# Patient Record
Sex: FEMALE | Race: ASIAN | NOT HISPANIC OR LATINO | Employment: FULL TIME | URBAN - METROPOLITAN AREA
[De-identification: names, ages, dates, MRNs, and addresses within clinical notes are randomized per-mention and may not be internally consistent; named-entity substitution may affect disease eponyms.]

---

## 2023-04-04 ENCOUNTER — MEDICAL CORRESPONDENCE (OUTPATIENT)
Dept: HEALTH INFORMATION MANAGEMENT | Facility: CLINIC | Age: 38
End: 2023-04-04

## 2023-04-04 ENCOUNTER — LAB REQUISITION (OUTPATIENT)
Dept: LAB | Facility: CLINIC | Age: 38
End: 2023-04-04
Payer: COMMERCIAL

## 2023-04-04 DIAGNOSIS — Z01.419 ENCOUNTER FOR GYNECOLOGICAL EXAMINATION (GENERAL) (ROUTINE) WITHOUT ABNORMAL FINDINGS: ICD-10-CM

## 2023-04-04 LAB — PHQ9 SCORE: 7

## 2023-04-04 PROCEDURE — 87624 HPV HI-RISK TYP POOLED RSLT: CPT | Mod: ORL | Performed by: OBSTETRICS & GYNECOLOGY

## 2023-04-04 PROCEDURE — G0145 SCR C/V CYTO,THINLAYER,RESCR: HCPCS | Mod: ORL | Performed by: OBSTETRICS & GYNECOLOGY

## 2023-04-05 ENCOUNTER — TRANSFERRED RECORDS (OUTPATIENT)
Dept: HEALTH INFORMATION MANAGEMENT | Facility: CLINIC | Age: 38
End: 2023-04-05

## 2023-04-05 ENCOUNTER — LAB REQUISITION (OUTPATIENT)
Dept: LAB | Facility: CLINIC | Age: 38
End: 2023-04-05
Payer: COMMERCIAL

## 2023-04-05 DIAGNOSIS — Z13.1 ENCOUNTER FOR SCREENING FOR DIABETES MELLITUS: ICD-10-CM

## 2023-04-05 DIAGNOSIS — Z11.3 ENCOUNTER FOR SCREENING FOR INFECTIONS WITH A PREDOMINANTLY SEXUAL MODE OF TRANSMISSION: ICD-10-CM

## 2023-04-05 DIAGNOSIS — Z13.228 ENCOUNTER FOR SCREENING FOR OTHER METABOLIC DISORDERS: ICD-10-CM

## 2023-04-05 DIAGNOSIS — Z13.220 ENCOUNTER FOR SCREENING FOR LIPOID DISORDERS: ICD-10-CM

## 2023-04-05 LAB
CHOLEST SERPL-MCNC: 208 MG/DL
ERYTHROCYTE [DISTWIDTH] IN BLOOD BY AUTOMATED COUNT: 12.4 % (ref 10–15)
HBA1C MFR BLD: 7 %
HCT VFR BLD AUTO: 40.8 % (ref 35–47)
HDLC SERPL-MCNC: 37 MG/DL
HGB BLD-MCNC: 13.8 G/DL (ref 11.7–15.7)
LDLC SERPL CALC-MCNC: 119 MG/DL
MCH RBC QN AUTO: 31.4 PG (ref 26.5–33)
MCHC RBC AUTO-ENTMCNC: 33.8 G/DL (ref 31.5–36.5)
MCV RBC AUTO: 93 FL (ref 78–100)
NONHDLC SERPL-MCNC: 171 MG/DL
PLATELET # BLD AUTO: 269 10E3/UL (ref 150–450)
RBC # BLD AUTO: 4.4 10E6/UL (ref 3.8–5.2)
TRIGL SERPL-MCNC: 261 MG/DL
WBC # BLD AUTO: 7.5 10E3/UL (ref 4–11)

## 2023-04-05 PROCEDURE — 86780 TREPONEMA PALLIDUM: CPT | Mod: ORL | Performed by: OBSTETRICS & GYNECOLOGY

## 2023-04-05 PROCEDURE — 86696 HERPES SIMPLEX TYPE 2 TEST: CPT | Mod: ORL | Performed by: OBSTETRICS & GYNECOLOGY

## 2023-04-05 PROCEDURE — 83036 HEMOGLOBIN GLYCOSYLATED A1C: CPT | Mod: ORL | Performed by: OBSTETRICS & GYNECOLOGY

## 2023-04-05 PROCEDURE — 87340 HEPATITIS B SURFACE AG IA: CPT | Mod: ORL | Performed by: OBSTETRICS & GYNECOLOGY

## 2023-04-05 PROCEDURE — 87389 HIV-1 AG W/HIV-1&-2 AB AG IA: CPT | Mod: ORL | Performed by: OBSTETRICS & GYNECOLOGY

## 2023-04-05 PROCEDURE — 80061 LIPID PANEL: CPT | Mod: ORL | Performed by: OBSTETRICS & GYNECOLOGY

## 2023-04-05 PROCEDURE — 85027 COMPLETE CBC AUTOMATED: CPT | Mod: ORL | Performed by: OBSTETRICS & GYNECOLOGY

## 2023-04-06 LAB
BKR LAB AP GYN ADEQUACY: NORMAL
BKR LAB AP GYN INTERPRETATION: NORMAL
BKR LAB AP HPV REFLEX: NORMAL
BKR LAB AP LMP: NORMAL
BKR LAB AP PREVIOUS ABNL DX: NORMAL
BKR LAB AP PREVIOUS ABNORMAL: NORMAL
HBV SURFACE AG SERPL QL IA: NONREACTIVE
HIV 1+2 AB+HIV1 P24 AG SERPL QL IA: NONREACTIVE
HSV1 IGG SERPL QL IA: <0.01 INDEX
HSV1 IGG SERPL QL IA: NORMAL
HSV2 IGG SERPL QL IA: 0.06 INDEX
HSV2 IGG SERPL QL IA: NORMAL
PATH REPORT.COMMENTS IMP SPEC: NORMAL
PATH REPORT.COMMENTS IMP SPEC: NORMAL
PATH REPORT.RELEVANT HX SPEC: NORMAL
T PALLIDUM AB SER QL: NONREACTIVE

## 2023-04-10 LAB
HUMAN PAPILLOMA VIRUS 16 DNA: NEGATIVE
HUMAN PAPILLOMA VIRUS 18 DNA: NEGATIVE
HUMAN PAPILLOMA VIRUS FINAL DIAGNOSIS: NORMAL
HUMAN PAPILLOMA VIRUS OTHER HR: NEGATIVE

## 2023-04-11 ENCOUNTER — MEDICAL CORRESPONDENCE (OUTPATIENT)
Dept: HEALTH INFORMATION MANAGEMENT | Facility: CLINIC | Age: 38
End: 2023-04-11
Payer: COMMERCIAL

## 2023-04-13 ENCOUNTER — TRANSCRIBE ORDERS (OUTPATIENT)
Dept: OTHER | Age: 38
End: 2023-04-13

## 2023-04-13 DIAGNOSIS — R73.09 OTHER ABNORMAL GLUCOSE: Primary | ICD-10-CM

## 2023-06-25 ENCOUNTER — HEALTH MAINTENANCE LETTER (OUTPATIENT)
Age: 38
End: 2023-06-25

## 2023-08-04 NOTE — CONFIDENTIAL NOTE
RECORDS RECEIVED FROM: internal / External     DATE RECEIVED: 10.12.23   NOTES (FOR ALL VISITS) STATUS DETAILS   OFFICE NOTES from referring provider External   Dr Khari Pierre @ [facility]    OFFICE NOTES from other specialist  4.11.23, 4.5.23 [facility]      MEDICATION LIST internal       LABS     DIABETES: HBGA1C, CREATININE, FASTING LIPIDS, MICROALBUMIN URINE, POTASSIUM, TSH, T4    THYROID: TSH, T4, CBC, THYRODLONULIN, TOTAL T3, FREE T4, CALCITONIN, CEA internal /External   Cbc-4.5.23  UDUE9Z-8.5.23  Received labs- 4.5.23

## 2023-10-05 NOTE — PROGRESS NOTES
Outcome for 10/05/23 11:47 AM: Dr. Scribblest message sent  Kathryn Portillo MA  Outcome for 10/10/23 9:09 AM: Patient is not checking blood sugars  Rebecca Mobley LPN

## 2023-10-10 ENCOUNTER — TELEPHONE (OUTPATIENT)
Dept: ENDOCRINOLOGY | Facility: CLINIC | Age: 38
End: 2023-10-10
Payer: COMMERCIAL

## 2023-10-10 NOTE — TELEPHONE ENCOUNTER
Spoke with patient in regards to obtaining blood glucose data for appointment scheduled on 10/12/2023. Patient is currently not checking blood sugars. Patient reports had surgery to remove a thyroid nodule about 3 weeks ago. Plans to have follow up blood work on week 5 of post-op. Would like to still keep appointment for consult in regards to diabetes.     Rebecca Mobley LPN 10/10/23 9:06 AM

## 2023-10-11 NOTE — PROGRESS NOTES
Endocrinology and Diabetes Clinic Visit     Reason for Consult: High hemoglobin A1c   Consulting Provider: Patricia Cardoso   PCP: No primary care provider on file.    Subjective:   Jaycee Higgins is a 37 year old female seen today for a new consultation for a recent high hemoglobin A1C with past medical history significant for ADHD, GERD, and recent thyroid cancer diagnosis. This visit was completed virtually.     On interview today, patient shares that over the COVID pandemic she did not go to the doctor and so more recently with changing insurance companies started establishing herself with a few physicians after needing to get her IUD replaced. At her IUD visit she had lab work done, and this revealed a HA1C of 7.0 and her doctor referred her to endocrinology and recommended establishing with a PCP. Since then, she established with a PCP in [place] who started her on metformin 500 mg extended release and losartan 25 mg. Her PCP also found a thyroid nodule on exam, which lead to an FNA biopsy that was inconclusive and followed by a right hemithyroidectomy with one parathyroid gland also removed (it was found to be enlarged in surgery) and follow up pathology showed cancer. Her thyroid surgery was around 3 weeks ago at [facility] in [facility]. We do not currently have access to the surgical pathology or to additional records of this diagnosis.     From a diabetes consult perspective, patient shared that she has been taking the metformin with no issues. She endorses fatigue, but attributes this to her recent surgery. She notes that she drinks a lot of water partially to stay hydrated and partially because she is thirsty. She does note that she does urinate frequently, but attributes this to water intake. She tries to play tennis 2-3 times a week and typically eats around two meals a day with when she has to take her medications. She does not smoke or drink alcohol. She does note  that she has poor vision, but has an eye doctor she sees each year. She denies numbness or tingling in her legs and any heart problems. Her weight has decreased since her thyroid surgery and more recently she has been craving sugar. She denies any history of anemia and endorses some iron deficiency, but this resolved with eating more beans before donating blood. She denies any history of diabetic coma. She notes that her twin sister has type 2 diabetes that was diagnosed a few years ago.         10/5/2023     8:21 PM   including   1. Since your last visit to our clinic (or if this your first visit, since you last saw your primary care provider), have you experienced any of the following symptoms that may be related to low blood sugars? Sweating that wasn't due to exertion   2. Since your last visit to our clinic (or if this your first visit, since you last saw your primary care provider), have you experienced any of the following symptoms that may be related to prolonged high blood sugars? No, I have not experienced any of these symptoms   3. Have you had any concerns that you would like to discuss today? Other   4. Do you have any female family members who have had heart attacks or strokes before age 60 or male relatives who have had heart attacks or strokes before age 50? No   5. Do you have any family members who have had complications from diabetes such as kidney disease, heart disease or strokes, retinopathy (a vision problem), or amputations? No   6. Who do you live with?  No one   Little interest or pleasure in doing things? Not at all   Feeling down, depressed, or hopeless? Not at all   10.  Are you considering a pregnancy or interested in discussing pregnancy prevention today? No        Medications:   Current Outpatient Medications   Medication Sig Dispense Refill    albuterol (PROAIR HFA/PROVENTIL HFA/VENTOLIN HFA) 108 (90 Base) MCG/ACT inhaler Inhale 2 puffs into the lungs every 4 hours as needed       amphetamine-dextroamphetamine (ADDERALL XR) 20 MG 24 hr capsule       levonorgestrel (KYLEENA) 19.5 MG IUD 1 each by Intrauterine route      losartan (COZAAR) 25 MG tablet       metFORMIN (GLUCOPHAGE XR) 500 MG 24 hr tablet       naproxen (NAPROSYN) 500 MG tablet        In addition:   Escitalopram 1 tab per day for anxiety  Nexplanon (placed 4/2023, IUD removed)    Past Medical History:  No past medical history on file.  ADHD, GERD, recent diagnosis of thyroid cancer     Past surgical history:  No past surgical history on file.  Recent right hemithyroidectomy with parathyroid gland removal at [facility] in [facility], completed late September    Patient Active Problem List   Diagnosis    Diabetes mellitus, type 2 (H)       Family History:  No family history on file.  Patient is adopted. Patient has twin sister who was diagnosed with type 2 diabetes a few years ago.     Social History:  Social History     Tobacco Use    Smoking status: Former     Types: Cigarettes    Smokeless tobacco: Former    Tobacco comments:     Smoked socially a long time ago    Substance Use Topics    Alcohol use: Not on file     Patient does not currently smoke or consume alcohol. Patient works as a .     Review of Systems:   A comprehensive ROS was negative except as noted in HPI.     Physical Examination:  Wt Readings from Last 4 Encounters:   No data found for Wt     There is no height or weight on file to calculate BMI.    BP Readings from Last 3 Encounters:   No data found for BP     GENERAL: Healthy, alert and no distress  EYES: Eyes grossly normal to inspection.  No discharge or erythema, or obvious scleral/conjunctival abnormalities.  NECK: No visible goiter.   RESP: No audible wheeze, cough, or visible cyanosis.  No increased work of breathing.    SKIN: Visible skin clear. No significant rash, abnormal pigmentation or lesions.  NEURO: Cranial nerves grossly intact.  Mentation and speech  appropriate.  PSYCH: Affect normal/bright, judgement and insight intact, normal speech and appearance well-groomed.    Labs and Studies:   Lab Results   Component Value Date    A1C 7.0 04/05/2023       Latest Reference Range & Units 04/05/23 09:41   Cholesterol <200 mg/dL 208 (H)   HDL Cholesterol >=50 mg/dL 37 (L)   Hemoglobin A1C <5.7 % 7.0 (H)   LDL Cholesterol Calculated <=100 mg/dL 119 (H)   Non HDL Cholesterol <130 mg/dL 171 (H)   Triglycerides <150 mg/dL 261 (H)   WBC 4.0 - 11.0 10e3/uL 7.5   Hemoglobin 11.7 - 15.7 g/dL 13.8   Hematocrit 35.0 - 47.0 % 40.8   Platelet Count 150 - 450 10e3/uL 269   RBC Count 3.80 - 5.20 10e6/uL 4.40   MCV 78 - 100 fL 93   MCH 26.5 - 33.0 pg 31.4   MCHC 31.5 - 36.5 g/dL 33.8   RDW 10.0 - 15.0 % 12.4   Treponema Antibodies Nonreactive  Nonreactive   Hep B Surface Agn Nonreactive  Nonreactive   HERPES SIMPLEX VIRUS TYPE 1 AND 2 IGG  Rpt   HSV Type 1 IgG Instrument Value <0.90 Index <0.01   Herpes Simplex Virus Type 1 IgG No HSV-1 IgG antibodies detected  No HSV-1 IgG antibodies detected.   HSV Type 2 IgG Instrument Value <0.90 Index 0.06   Herpes Simplex Virus Type 2 IgG No HSV-2 IgG antibodies detected  No HSV-2 IgG antibodies detected.   HIV Antigen Antibody Combo Nonreactive  Nonreactive   (H): Data is abnormally high  (L): Data is abnormally low  Rpt: View report in Results Review for more information    MRI abdomen 2018: No adrenal nodule       Assessment: Jaycee Higgins is a 37 year old female seen today for a new consultation for a recent high hemoglobin A1C with past medical history significant for ADHD, GERD, and recent thyroid cancer diagnosis.     1. High hemoglobin A1C-  Discussed with the patient that with only one hemoglobin A1C result at 7.0, another follow-up lab needs to be completed to confirm a diagnosis of diabetes. Additionally, the type of the diabetes (Type 1 vs Type 2) also needs to be determined with follow-up labs of c-peptide and WILLIAM antibody to  rule out an autoimmune cause. She should continue metformin, and we made a plan for close follow up in our clinic.     2. Thyroid cancer - Patient had a recent diagnosis of thyroid cancer that is being follow-up by [facility] in [facility]. We do not have records for review. She has follow up with an outside endocrinologist already scheduled and was encouraged to keep this appointment. In the future, we can consolidate care so that she is following with only one endocrine clinic for both diabetes and thyroid cancer management.     3. Obesity.     4. Hepatic steatosis on previous imaging.     5. Dyslipidemia.     6. Hypertension.     Plan:   - Have labs done: stimulated c-peptide, glucose, CMP, WILLIAM antibody, hemoglobin A1C, TSH with T4 reflex  - Continue metformin    Follow up in 2 months.       Video-Visit Details    Type of Service:  Video Visit    Video Start Time:  10:55 AM  (Dr. Garcia start 11:36)    Video End Time:  11:52 AM  (Dr. Garcia end 11:52)    Platform Used for Video Visit: GutCheck     Provider Location: Off-site      Sara Riley MS3  Medical Student       Physician Attestation   I, Jennifer Garcia MD, was present with the medical student who participated in the service and in the documentation of the note.  I have verified the history and personally performed the physical exam and medical decision making.  I agree with the assessment and plan of care as documented in the note.      New diagnosis of diabetes. This is most likely type 2 diabetes, but given her young age we will also include testing for autoimmune/type 1 diabetes. BRANT is also a possibility and testing for this could be considered in the future. She has no findings today to suggest Cushing syndrome or another underlying cause for diabetes.     Follow up is needed for her reported recent diagnosis of thyroid carcinoma. She has this already planned at an outside clinic and was encouraged to follow through. In the future,  for diabetes our plan will be to refer to CDE and provide teaching for finger stick blood glucose monitoring; this was deferred for now given that she already has a lot to manage with the thyroid diagnosis.     62 minutes spent on the date of the encounter doing chart review, history and exam, documentation and further activities per the note.     Jennifer Garcia MD  Endocrinology and Diabetes   [phone]

## 2023-10-12 ENCOUNTER — VIRTUAL VISIT (OUTPATIENT)
Dept: ENDOCRINOLOGY | Facility: CLINIC | Age: 38
End: 2023-10-12
Payer: COMMERCIAL

## 2023-10-12 ENCOUNTER — PRE VISIT (OUTPATIENT)
Dept: ENDOCRINOLOGY | Facility: CLINIC | Age: 38
End: 2023-10-12

## 2023-10-12 DIAGNOSIS — E66.01 MORBID OBESITY (H): ICD-10-CM

## 2023-10-12 DIAGNOSIS — R73.09 OTHER ABNORMAL GLUCOSE: ICD-10-CM

## 2023-10-12 DIAGNOSIS — C73 MALIGNANT NEOPLASM OF THYROID GLAND (H): ICD-10-CM

## 2023-10-12 DIAGNOSIS — E11.9 TYPE 2 DIABETES MELLITUS WITHOUT COMPLICATION, WITHOUT LONG-TERM CURRENT USE OF INSULIN (H): Primary | ICD-10-CM

## 2023-10-12 PROCEDURE — 99205 OFFICE O/P NEW HI 60 MIN: CPT | Mod: VID | Performed by: INTERNAL MEDICINE

## 2023-10-12 RX ORDER — ALBUTEROL SULFATE 90 UG/1
2 AEROSOL, METERED RESPIRATORY (INHALATION) EVERY 4 HOURS PRN
COMMUNITY
Start: 2022-07-01 | End: 2024-05-22

## 2023-10-12 RX ORDER — NAPROXEN 500 MG/1
TABLET ORAL
COMMUNITY
Start: 2023-03-08 | End: 2024-04-30

## 2023-10-12 RX ORDER — METFORMIN HCL 500 MG
TABLET, EXTENDED RELEASE 24 HR ORAL
COMMUNITY
Start: 2023-06-06

## 2023-10-12 RX ORDER — LOSARTAN POTASSIUM 25 MG/1
TABLET ORAL
COMMUNITY
Start: 2023-06-06

## 2023-10-12 RX ORDER — DEXTROAMPHETAMINE SACCHARATE, AMPHETAMINE ASPARTATE MONOHYDRATE, DEXTROAMPHETAMINE SULFATE AND AMPHETAMINE SULFATE 5; 5; 5; 5 MG/1; MG/1; MG/1; MG/1
CAPSULE, EXTENDED RELEASE ORAL
COMMUNITY
Start: 2023-06-06 | End: 2024-04-30

## 2023-10-12 ASSESSMENT — PAIN SCALES - GENERAL: PAINLEVEL: MILD PAIN (2)

## 2023-10-12 NOTE — LETTER
10/12/2023       RE: Jaycee Higgins  [address] [place]   [address]  [place] [address]     Dear Colleague,    Thank you for referring your patient, Jaycee Higgins, to the [facility] ENDOCRINOLOG[facility] [place] [facility]. Please see a copy of my visit note below.    Outcome for 10/05/23 11:47 AM: Vascular Imaging message sent  Kathryn Portillo MA  Outcome for 10/10/23 9:09 AM: Patient is not checking blood sugars  Rebecca Mobley LPN         Endocrinology and Diabetes Clinic Visit     Reason for Consult: High hemoglobin A1c   Consulting Provider: Patricia Cardoso   PCP: No primary care provider on file.    Subjective:   Jaycee Higgins is a 37 year old female seen today for a new consultation for a recent high hemoglobin A1C with past medical history significant for ADHD, GERD, and recent thyroid cancer diagnosis. This visit was completed virtually.     On interview today, patient shares that over the COVID pandemic she did not go to the doctor and so more recently with changing insurance companies started establishing herself with a few physicians after needing to get her IUD replaced. At her IUD visit she had lab work done, and this revealed a HA1C of 7.0 and her doctor referred her to endocrinology and recommended establishing with a PCP. Since then, she established with a PCP in [place] who started her on metformin 500 mg extended release and losartan 25 mg. Her PCP also found a thyroid nodule on exam, which lead to an FNA biopsy that was inconclusive and followed by a right hemithyroidectomy with one parathyroid gland also removed (it was found to be enlarged in surgery) and follow up pathology showed cancer. Her thyroid surgery was around 3 weeks ago at [facility] in [facility]. We do not currently have access to the surgical pathology or to additional records of this diagnosis.     From a diabetes consult perspective,  Report given to ESTEFANY Cordoba RN to include ED Summary. Patient resting quietly, lightly sleeping and easily arrousable. Patient with hand bent, Zosyn still not infused. Reiterated need to keep hand straight and patient verbalized understanding, has hand resting straight on thigh. patient shared that she has been taking the metformin with no issues. She endorses fatigue, but attributes this to her recent surgery. She notes that she drinks a lot of water partially to stay hydrated and partially because she is thirsty. She does note that she does urinate frequently, but attributes this to water intake. She tries to play tennis 2-3 times a week and typically eats around two meals a day with when she has to take her medications. She does not smoke or drink alcohol. She does note that she has poor vision, but has an eye doctor she sees each year. She denies numbness or tingling in her legs and any heart problems. Her weight has decreased since her thyroid surgery and more recently she has been craving sugar. She denies any history of anemia and endorses some iron deficiency, but this resolved with eating more beans before donating blood. She denies any history of diabetic coma. She notes that her twin sister has type 2 diabetes that was diagnosed a few years ago.         10/5/2023     8:21 PM   including   1. Since your last visit to our clinic (or if this your first visit, since you last saw your primary care provider), have you experienced any of the following symptoms that may be related to low blood sugars? Sweating that wasn't due to exertion   2. Since your last visit to our clinic (or if this your first visit, since you last saw your primary care provider), have you experienced any of the following symptoms that may be related to prolonged high blood sugars? No, I have not experienced any of these symptoms   3. Have you had any concerns that you would like to discuss today? Other   4. Do you have any female family members who have had heart attacks or strokes before age 60 or male relatives who have had heart attacks or strokes before age 50? No   5. Do you have any family members who have had complications from diabetes such as kidney disease, heart disease or strokes, retinopathy (a  vision problem), or amputations? No   6. Who do you live with?  No one   Little interest or pleasure in doing things? Not at all   Feeling down, depressed, or hopeless? Not at all   10.  Are you considering a pregnancy or interested in discussing pregnancy prevention today? No        Medications:   Current Outpatient Medications   Medication Sig Dispense Refill    albuterol (PROAIR HFA/PROVENTIL HFA/VENTOLIN HFA) 108 (90 Base) MCG/ACT inhaler Inhale 2 puffs into the lungs every 4 hours as needed      amphetamine-dextroamphetamine (ADDERALL XR) 20 MG 24 hr capsule       levonorgestrel (KYLEENA) 19.5 MG IUD 1 each by Intrauterine route      losartan (COZAAR) 25 MG tablet       metFORMIN (GLUCOPHAGE XR) 500 MG 24 hr tablet       naproxen (NAPROSYN) 500 MG tablet        In addition:   Escitalopram 1 tab per day for anxiety  Nexplanon (placed 4/2023, IUD removed)    Past Medical History:  No past medical history on file.  ADHD, GERD, recent diagnosis of thyroid cancer     Past surgical history:  No past surgical history on file.  Recent right hemithyroidectomy with parathyroid gland removal at [facility] in [facility], completed late September    Patient Active Problem List   Diagnosis    Diabetes mellitus, type 2 (H)       Family History:  No family history on file.  Patient is adopted. Patient has twin sister who was diagnosed with type 2 diabetes a few years ago.     Social History:  Social History     Tobacco Use    Smoking status: Former     Types: Cigarettes    Smokeless tobacco: Former    Tobacco comments:     Smoked socially a long time ago    Substance Use Topics    Alcohol use: Not on file     Patient does not currently smoke or consume alcohol. Patient works as a .     Review of Systems:   A comprehensive ROS was negative except as noted in HPI.     Physical Examination:  Wt Readings from Last 4 Encounters:   No data found for Wt     There is no height or weight on  file to calculate BMI.    BP Readings from Last 3 Encounters:   No data found for BP     GENERAL: Healthy, alert and no distress  EYES: Eyes grossly normal to inspection.  No discharge or erythema, or obvious scleral/conjunctival abnormalities.  NECK: No visible goiter.   RESP: No audible wheeze, cough, or visible cyanosis.  No increased work of breathing.    SKIN: Visible skin clear. No significant rash, abnormal pigmentation or lesions.  NEURO: Cranial nerves grossly intact.  Mentation and speech appropriate.  PSYCH: Affect normal/bright, judgement and insight intact, normal speech and appearance well-groomed.    Labs and Studies:   Lab Results   Component Value Date    A1C 7.0 04/05/2023       Latest Reference Range & Units 04/05/23 09:41   Cholesterol <200 mg/dL 208 (H)   HDL Cholesterol >=50 mg/dL 37 (L)   Hemoglobin A1C <5.7 % 7.0 (H)   LDL Cholesterol Calculated <=100 mg/dL 119 (H)   Non HDL Cholesterol <130 mg/dL 171 (H)   Triglycerides <150 mg/dL 261 (H)   WBC 4.0 - 11.0 10e3/uL 7.5   Hemoglobin 11.7 - 15.7 g/dL 13.8   Hematocrit 35.0 - 47.0 % 40.8   Platelet Count 150 - 450 10e3/uL 269   RBC Count 3.80 - 5.20 10e6/uL 4.40   MCV 78 - 100 fL 93   MCH 26.5 - 33.0 pg 31.4   MCHC 31.5 - 36.5 g/dL 33.8   RDW 10.0 - 15.0 % 12.4   Treponema Antibodies Nonreactive  Nonreactive   Hep B Surface Agn Nonreactive  Nonreactive   HERPES SIMPLEX VIRUS TYPE 1 AND 2 IGG  Rpt   HSV Type 1 IgG Instrument Value <0.90 Index <0.01   Herpes Simplex Virus Type 1 IgG No HSV-1 IgG antibodies detected  No HSV-1 IgG antibodies detected.   HSV Type 2 IgG Instrument Value <0.90 Index 0.06   Herpes Simplex Virus Type 2 IgG No HSV-2 IgG antibodies detected  No HSV-2 IgG antibodies detected.   HIV Antigen Antibody Combo Nonreactive  Nonreactive   (H): Data is abnormally high  (L): Data is abnormally low  Rpt: View report in Results Review for more information    MRI abdomen 2018: No adrenal nodule       Assessment: Jaycee Higgins is  a 37 year old female seen today for a new consultation for a recent high hemoglobin A1C with past medical history significant for ADHD, GERD, and recent thyroid cancer diagnosis.     1. High hemoglobin A1C-  Discussed with the patient that with only one hemoglobin A1C result at 7.0, another follow-up lab needs to be completed to confirm a diagnosis of diabetes. Additionally, the type of the diabetes (Type 1 vs Type 2) also needs to be determined with follow-up labs of c-peptide and WILLIAM antibody to rule out an autoimmune cause. She should continue metformin, and we made a plan for close follow up in our clinic.     2. Thyroid cancer - Patient had a recent diagnosis of thyroid cancer that is being follow-up by [facility]. We do not have records for review. She has follow up with an outside endocrinologist already scheduled and was encouraged to keep this appointment. In the future, we can consolidate care so that she is following with only one endocrine clinic for both diabetes and thyroid cancer management.     3. Obesity.     4. Hepatic steatosis on previous imaging.     5. Dyslipidemia.     6. Hypertension.     Plan:   - Have labs done: stimulated c-peptide, glucose, CMP, WILLIAM antibody, hemoglobin A1C, TSH with T4 reflex  - Continue metformin    Follow up in 2 months.       Video-Visit Details    Type of Service:  Video Visit    Video Start Time:  10:55 AM  (Dr. Garcia start 11:36)    Video End Time:  11:52 AM  (Dr. Garcia end 11:52)    Platform Used for Video Visit: [employer]     Provider Location: Off-site      Sara Riley MS3  Medical Student       Physician Attestation  I, Jennifer Garcia MD, was present with the medical student who participated in the service and in the documentation of the note.  I have verified the history and personally performed the physical exam and medical decision making.  I agree with the assessment and plan of care as documented in the note.      New  diagnosis of diabetes. This is most likely type 2 diabetes, but given her young age we will also include testing for autoimmune/type 1 diabetes. BRANT is also a possibility and testing for this could be considered in the future. She has no findings today to suggest Cushing syndrome or another underlying cause for diabetes.     Follow up is needed for her reported recent diagnosis of thyroid carcinoma. She has this already planned at an outside clinic and was encouraged to follow through. In the future, for diabetes our plan will be to refer to CDE and provide teaching for finger stick blood glucose monitoring; this was deferred for now given that she already has a lot to manage with the thyroid diagnosis.     62 minutes spent on the date of the encounter doing chart review, history and exam, documentation and further activities per the note.     Jennifer Garcia MD  Endocrinology and Diabetes   [phone]

## 2023-10-12 NOTE — NURSING NOTE
Is the patient currently in the state of [place]? YES    Visit mode:VIDEO    If the visit is dropped, the patient can be reconnected by: VIDEO VISIT: Send to e-mail at: [email or web address]    Will anyone else be joining the visit? NO  (If patient encounters technical issues they should call [phone]    How would you like to obtain your AVS? MyChart    Are changes needed to the allergy or medication list? No    Reason for visit: Consult    Shelby Kocher VVF

## 2023-10-12 NOTE — PATIENT INSTRUCTIONS
Have labs done after eating a meal.    Please reach out to the following centers to schedule your appointment:       Lab    General [phone]   [facility] [phone]   [place] [phone]   [facility]  [phone]   [facility] [phone]   [place] [phone]   [facility] [phone]   [facility] [place] [place] [facility] [phone]   [place] [phone]   [place] [phone]   [place] [phone]         For any questions, please reach out to the [facility] Endocrinology Clinic Number for assistance: [phone].

## 2023-10-18 ENCOUNTER — TELEPHONE (OUTPATIENT)
Dept: ENDOCRINOLOGY | Facility: CLINIC | Age: 38
End: 2023-10-18
Payer: COMMERCIAL

## 2023-10-18 NOTE — TELEPHONE ENCOUNTER
Left Voicemail (1st Attempt) and Sent Robert (1st Attempt) for the patient to call back and schedule the following:    Appointment type: Return Diabetes   Provider:   Return date: Return in about 2 months (around 12/12/2023). Ok to use a ELHAM per Dr. Garcia  Specialty phone number: [phone]  Additional appointment(s) needed: LABS   Additonal Notes: LVM and sent Joselyn Torres on 10/18/2023 at 2:40 PM

## 2023-10-20 NOTE — TELEPHONE ENCOUNTER
Spoke with patient and scheduled the 2 mo follow-up appointment with Dr. Garcia. They are scheduled for an in person visit on 12/15/23. ELHAM hardwick per provider. Patient stated they will schedule their own lab appointment next week.

## 2023-11-12 ENCOUNTER — HEALTH MAINTENANCE LETTER (OUTPATIENT)
Age: 38
End: 2023-11-12

## 2024-01-19 ENCOUNTER — LAB (OUTPATIENT)
Dept: LAB | Facility: CLINIC | Age: 39
End: 2024-01-19
Payer: COMMERCIAL

## 2024-01-19 DIAGNOSIS — C73 MALIGNANT NEOPLASM OF THYROID GLAND (H): ICD-10-CM

## 2024-01-19 DIAGNOSIS — R73.09 OTHER ABNORMAL GLUCOSE: ICD-10-CM

## 2024-01-19 DIAGNOSIS — E66.01 MORBID OBESITY (H): ICD-10-CM

## 2024-01-19 DIAGNOSIS — E11.9 TYPE 2 DIABETES MELLITUS WITHOUT COMPLICATION, WITHOUT LONG-TERM CURRENT USE OF INSULIN (H): ICD-10-CM

## 2024-01-19 LAB — HBA1C MFR BLD: 5.9 % (ref 0–5.6)

## 2024-01-19 PROCEDURE — 99000 SPECIMEN HANDLING OFFICE-LAB: CPT

## 2024-01-19 PROCEDURE — 36415 COLL VENOUS BLD VENIPUNCTURE: CPT

## 2024-01-19 PROCEDURE — 84443 ASSAY THYROID STIM HORMONE: CPT

## 2024-01-19 PROCEDURE — 83036 HEMOGLOBIN GLYCOSYLATED A1C: CPT

## 2024-01-19 PROCEDURE — 84681 ASSAY OF C-PEPTIDE: CPT

## 2024-01-19 PROCEDURE — 84439 ASSAY OF FREE THYROXINE: CPT

## 2024-01-19 PROCEDURE — 80053 COMPREHEN METABOLIC PANEL: CPT

## 2024-01-19 PROCEDURE — 86341 ISLET CELL ANTIBODY: CPT | Mod: 90

## 2024-01-20 LAB
ALBUMIN SERPL BCG-MCNC: 4.7 G/DL (ref 3.5–5.2)
ALP SERPL-CCNC: 64 U/L (ref 40–150)
ALT SERPL W P-5'-P-CCNC: 17 U/L (ref 0–50)
ANION GAP SERPL CALCULATED.3IONS-SCNC: 9 MMOL/L (ref 7–15)
AST SERPL W P-5'-P-CCNC: 20 U/L (ref 0–45)
BILIRUB SERPL-MCNC: 0.4 MG/DL
BUN SERPL-MCNC: 11.5 MG/DL (ref 6–20)
CALCIUM SERPL-MCNC: 9.5 MG/DL (ref 8.6–10)
CHLORIDE SERPL-SCNC: 103 MMOL/L (ref 98–107)
CREAT SERPL-MCNC: 0.61 MG/DL (ref 0.51–0.95)
DEPRECATED HCO3 PLAS-SCNC: 29 MMOL/L (ref 22–29)
EGFRCR SERPLBLD CKD-EPI 2021: >90 ML/MIN/1.73M2
GLUCOSE SERPL-MCNC: 108 MG/DL (ref 70–99)
POTASSIUM SERPL-SCNC: 3.5 MMOL/L (ref 3.4–5.3)
PROT SERPL-MCNC: 6.9 G/DL (ref 6.4–8.3)
SODIUM SERPL-SCNC: 141 MMOL/L (ref 135–145)
T4 FREE SERPL-MCNC: 0.98 NG/DL (ref 0.9–1.7)
TSH SERPL DL<=0.005 MIU/L-ACNC: 4.31 UIU/ML (ref 0.3–4.2)

## 2024-01-22 LAB — C PEPTIDE SERPL-MCNC: 5 NG/ML (ref 0.9–6.9)

## 2024-01-25 LAB — GAD65 AB SER IA-ACNC: <5 IU/ML

## 2024-04-30 PROBLEM — Z97.5 IUD (INTRAUTERINE DEVICE) IN PLACE: Status: ACTIVE | Noted: 2018-12-17

## 2024-04-30 PROBLEM — E78.00 HYPERCHOLESTEROLEMIA: Status: ACTIVE | Noted: 2017-06-29

## 2024-05-08 ENCOUNTER — OFFICE VISIT (OUTPATIENT)
Dept: PODIATRY | Facility: CLINIC | Age: 39
End: 2024-05-08
Payer: COMMERCIAL

## 2024-05-08 VITALS — SYSTOLIC BLOOD PRESSURE: 122 MMHG | DIASTOLIC BLOOD PRESSURE: 82 MMHG | WEIGHT: 220 LBS

## 2024-05-08 DIAGNOSIS — M25.561 CHRONIC PAIN OF BOTH KNEES: ICD-10-CM

## 2024-05-08 DIAGNOSIS — M72.2 PLANTAR FASCIITIS, BILATERAL: Primary | ICD-10-CM

## 2024-05-08 DIAGNOSIS — M21.42 FLAT FEET, BILATERAL: ICD-10-CM

## 2024-05-08 DIAGNOSIS — M25.552 HIP PAIN, BILATERAL: ICD-10-CM

## 2024-05-08 DIAGNOSIS — M21.41 FLAT FEET, BILATERAL: ICD-10-CM

## 2024-05-08 DIAGNOSIS — M25.551 HIP PAIN, BILATERAL: ICD-10-CM

## 2024-05-08 DIAGNOSIS — M25.562 CHRONIC PAIN OF BOTH KNEES: ICD-10-CM

## 2024-05-08 DIAGNOSIS — G89.29 CHRONIC PAIN OF BOTH KNEES: ICD-10-CM

## 2024-05-08 PROCEDURE — 99203 OFFICE O/P NEW LOW 30 MIN: CPT | Performed by: PODIATRIST

## 2024-05-08 NOTE — PATIENT INSTRUCTIONS
Thank you for choosing [facility] Podiatry / Foot & Ankle Surgery!    DR. HAYWOOD'S CLINIC LOCATIONS:     [facility] TRIAGE LINE: [phone]   [address] [place] [address] APPOINTMENTS: [phone]   [place] [address] RADIOLOGY: [phone]   (Every other Tues - Wed - Fri PM) SET UP SURGERY: [phone]    PHYSICAL THERAPY: [phone]   [facility] BILLING QUESTIONS: [phone]   [address] FAX: [phone]   [place] [address]    (Thurs & Fri AM)         PLANTAR FASCIITIS  Plantar fasciitis is often referred to as heel spurs or heel pain. Plantar fasciitis is a very common problem that affects people of all foot shapes, age, weight and activity level. Pain may be in the arch or on the weight-bearing surface of the heel. The pain may come on without injury or identifiable cause. Pain is generally present when first getting out of bed in the morning or up from a seated break.     CAUSES  The plantar fascia is a dense fibrous band of tissue that stretches across the bottom surface of the foot. The fascia helps support the foot muscles and arch. Plantar fasciitis is thought to be caused by mechanical strain or overload. Frequent walking without shoes or wearing unsupportive shoes is thought to cause structural overload and ultimately inflammation of the plantar fascia. Some people have heel spurs that can be seen on x-ray. The heel spur is actually a minor component of plantar fascitis and is largely ignored.       SELF TREATMENT   The easiest solution is to stop walking around your home without shoes. Plantar fasciitis is largely a shoe problem. Shoes are either not being worn often enough or your current shoes are inadequate for your weight, foot structure or activity level. The majority of shoes on the market today are not sufficient to resist development of plantar fasciitis or to promote healing. Assume that your current shoes are inadequate and will need to be replaced. Even  high quality shoes wear out with 6 months to one year of frequent use. Weight loss is another option. Losing ten pounds in the next two months may be enough to resolve the problem. Ice applied to the area of pain two to three times per day for ten minutes each session can be very helpful. Warm foot soaks in epsom salts can also relieve pain. This should continue until the problem resolves. Achilles tendon stretching is essential. Stretch multiple times daily to promote healing and to prevent recurrence in the future. Over all stretching of the body is helpful as well such as the calves, thighs and lower back. Normally when one area of the body is tight, other areas are too. Gentle Yoga can be good for this.     Over the counter topical anti inflammatories can be helpful such as biofreeze, bengay, salon pas, ect...  Oral ibuprofen or aleve is recommended as well to try to calm down inflammation.     Night splints can be helpful to gradually stretch the foot at night as a lot of pain is when you get up in the morning. Taking a towel or thera band and stretching the foot back multiple times before you get ou of bed can be beneficial as well.     MEDICAL TREATMENT  Medical treatments often include custom arch supports, cortisone injections, physical therapy, splints to be worn in bed, prescription medications and surgery. The home treatments listed above will be necessary regardless of these advanced medical treatments. Surgery is rarely needed but is very helpful in selected cases.     PROGNOSIS  Plantar fasciitis can last from one day to a lifetime. Some people get intermittent fascitis that is very short-lived. Others suffer daily for years. Excessive body weight, frequent bare foot walking, long hours on the feet, inadequate shoes, predisposing foot structures and excessive activity such as running are all potential issues that lead to chronic and/or recurring plantar fascitis. Having plantar fasciitis means that  you are forever prone to this problem and will require modification of some of the above factors. Most people seek treatment within one to four months. Healing usually requires a similar one to four month time frame. Healing time is relative to the amount of effort spent treating the problem.   Plantar fasciitis is highly recurrent. Risk factors often continue, including return to bare foot walking, inadequate shoes, excessive body weight, excessive activities, etc. Your life style and foot structure may predispose you to recurrent plantar fasciitis. A daily prevention regimen can be very helpful. Ongoing use of shoe inserts, careful attention to appropriate shoes, daily Achilles stretching, etc. may prevent recurrence. Prompt attention at the earliest warning signs of heel pain can resolve the problem in as short as a few days.     EXERCISES  Stair Exercise: Step on the stairs with the ball of your foot and hold your position for at least 15 seconds, then slowly step down with the heels of your foot. You can do this daily and as often as you want.   Picking the Towel: Sit comfortably and then pick the towel up with your toes. You can use any object other than a towel as long as the material can be soft and you can pick it up with your toes.  Rolling the Bottle: Use a small ball or frozen water bottle and then roll it around with your foot.   Flex the Toes: Sit comfortably and then flex your toes by pointing it towards the floor or towards your body. This will relax and flex your foot and exercise your plantar fascia, the calf, and the Achilles tendon. The inability of the foot to stretch often causes the bunching up of the plantar fascia area leading to the pain.  Calf/Achilles Stretching: Lay on you back and raise one foot, then point your toes towards the floor. See photo below:               Hold each stretch for 10 seconds. Stretch 10 times per set, three sets per day. Morning, afternoon and evening. If your  heel pain is very severe in the morning, consider doing the first set of stretches before you get out of bed.      OVER THE COUNTER INSERT RECOMMENDATIONS  SuperFeet   Sofsole Fit Spenco   Power Step   Walk-Fit Arch Cradles     Most of these can be found at your local [employer], [employer], or online.  **A good high quality over the counter insert should cost around $40-$50      [facility] [employer] LOCATIONS  [place]  [address] [facility]  [phone]   [address]  [phone] [place]  [address] [facility]  [phone]   [place]  [address]  [phone]   [place]  [address]  [phone] [place] [address]  [phone]   [place]  [address]  [phone]   [place]  [address] [place] [address]  [phone] [place]  [address]  [phone]      [facility] [place]  [address]  [place] [address]  Phone: [phone]  Fax: [phone] [facility]   [address]  [place] [address]  Phone: [phone]  Fax: [phone]   [facility] [place]  [address]  [place] [address]  Phone: [phone]  Fax: [phone] [facility]  [address]  [place] [address]  Phone: [phone]   Fax: [phone]   * Please call any location listed to make an appointment for a casting/fitting. Your referral was sent to their central office and they will all have the order on file.

## 2024-05-08 NOTE — PROGRESS NOTES
ASSESSMENT:  Encounter Diagnoses   Name Primary?    Plantar fasciitis, bilateral Yes    Chronic pain of both knees     Hip pain, bilateral     Flat feet, bilateral      MEDICAL DECISION MAKING:  The tenderness in the bilateral heel/arch and her reported history is consistent with plantar fascial pain.  She understands that my scope of practice is not involved workup involving the knees and hips.  She is interested in custom orthoses and hopes that they will help with all areas of discomfort.  This is reasonable, as her flatfoot structure can contribute to knee and hip pain.  If knee and hip pain persist, I explained that we can refer her to nonoperative orthopedics/sports medicine.    We discussed the causes and nature of arch and heel pain.  The anatomy and function of the plantar fascia was discussed.  The treatment plan discussed included calf and plantar fascial stretching, avoidance of barefoot walking, stiffer soled shoes, activity modification, over-the-counter arch supports versus custom orthoses, prn icing and NSAIDs.  A comprehensive After-Visit Summary was provided.     Jaycee Higgins is referred to [facility] for prescription orthoses.        Disclaimer: This note consists of symbols derived from keyboarding, dictation and/or voice recognition software. As a result, there may be errors in the script that have gone undetected. Please consider this when interpreting information found in this chart.    Bola Quiñonez DPM, FACFAS, MS    [facility] of [facility]/Foot & Ankle [facility]      ____________________________________________________________________    HPI:       Jaycee presents today reporting plantar fasciitis and new knee pain.    This involves both sides.  Onset was 5 weeks ago  Aching pain  Pain after periods of rest  She reports being on her feet 80% of the day  She has tried different inserts, new shoes, stretching, massage and over-the-counter pain relievers.     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    *No past medical history on file.*  *No past surgical history on file.*  *  Current Outpatient Medications   Medication Sig Dispense Refill    etonogestrel (NEXPLANON) 68 MG IMPL 1 each by Subdermal route once      losartan (COZAAR) 25 MG tablet       metFORMIN (GLUCOPHAGE XR) 500 MG 24 hr tablet       albuterol (PROAIR HFA/PROVENTIL HFA/VENTOLIN HFA) 108 (90 Base) MCG/ACT inhaler Inhale 2 puffs into the lungs every 4 hours as needed      levonorgestrel (KYLEENA) 19.5 MG IUD 1 each by Intrauterine route           EXAM:    Vitals: /82   Wt 99.8 kg (220 lb)   BMI: There is no height or weight on file to calculate BMI.    Constitutional:  Jaycee Higgins is in no apparent distress, appears well-nourished.  Cooperative with history and physical exam.    Vascular:  Pedal pulses are palpable for both the DP and PT arteries.  CFT < 3 sec.  No edema.      Neuro: Light touch sensation is intact to the L4, L5, S1 distributions  No evidence of weakness, spasticity, or contracture in the lower extremities.     Derm: Normal texture and turgor.  No erythema, ecchymosis, or cyanosis.  No open lesions.     Musculoskeletal:    Lower extremity muscle strength is normal.  With weightbearing there is abnormal midfoot pronation and some forefoot abduction.  No gross deformities.  Reported tenderness on palpation along the plantar medial arch just distal to the  calcaneus.  Adequate ankle range of motion.

## 2024-05-08 NOTE — LETTER
5/8/2024         RE: Jaycee Higgins  [address]  [place] [address]        Dear Colleague,    Thank you for referring your patient, Jaycee Higgins, to the [facility]. Please see a copy of my visit note below.    ASSESSMENT:  Encounter Diagnoses   Name Primary?     Plantar fasciitis, bilateral Yes     Chronic pain of both knees      Hip pain, bilateral      Flat feet, bilateral      MEDICAL DECISION MAKING:  The tenderness in the bilateral heel/arch and her reported history is consistent with plantar fascial pain.  She understands that my scope of practice is not involved workup involving the knees and hips.  She is interested in custom orthoses and hopes that they will help with all areas of discomfort.  This is reasonable, as her flatfoot structure can contribute to knee and hip pain.  If knee and hip pain persist, I explained that we can refer her to nonoperative orthopedics/sports medicine.    We discussed the causes and nature of arch and heel pain.  The anatomy and function of the plantar fascia was discussed.  The treatment plan discussed included calf and plantar fascial stretching, avoidance of barefoot walking, stiffer soled shoes, activity modification, over-the-counter arch supports versus custom orthoses, prn icing and NSAIDs.  A comprehensive After-Visit Summary was provided.     Jaycee Higgins is referred to [facility] for prescription orthoses.        Disclaimer: This note consists of symbols derived from keyboarding, dictation and/or voice recognition software. As a result, there may be errors in the script that have gone undetected. Please consider this when interpreting information found in this chart.    Bola Quiñonez, THONY, FACFAS, MS    [facility] of [facility]iatry/Foot & Ankle Surgery      ____________________________________________________________________    HPI:       Jaycee presents today  reporting plantar fasciitis and new knee pain.    This involves both sides.  Onset was 5 weeks ago  Aching pain  Pain after periods of rest  She reports being on her feet 80% of the day  She has tried different inserts, new shoes, stretching, massage and over-the-counter pain relievers.    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    *No past medical history on file.*  *No past surgical history on file.*  *  Current Outpatient Medications   Medication Sig Dispense Refill     etonogestrel (NEXPLANON) 68 MG IMPL 1 each by Subdermal route once       losartan (COZAAR) 25 MG tablet        metFORMIN (GLUCOPHAGE XR) 500 MG 24 hr tablet        albuterol (PROAIR HFA/PROVENTIL HFA/VENTOLIN HFA) 108 (90 Base) MCG/ACT inhaler Inhale 2 puffs into the lungs every 4 hours as needed       levonorgestrel (KYLEENA) 19.5 MG IUD 1 each by Intrauterine route           EXAM:    Vitals: /82   Wt 99.8 kg (220 lb)   BMI: There is no height or weight on file to calculate BMI.    Constitutional:  Jaycee Higgins is in no apparent distress, appears well-nourished.  Cooperative with history and physical exam.    Vascular:  Pedal pulses are palpable for both the DP and PT arteries.  CFT < 3 sec.  No edema.      Neuro: Light touch sensation is intact to the L4, L5, S1 distributions  No evidence of weakness, spasticity, or contracture in the lower extremities.     Derm: Normal texture and turgor.  No erythema, ecchymosis, or cyanosis.  No open lesions.     Musculoskeletal:    Lower extremity muscle strength is normal.  With weightbearing there is abnormal midfoot pronation and some forefoot abduction.  No gross deformities.  Reported tenderness on palpation along the plantar medial arch just distal to the  calcaneus.  Adequate ankle range of motion.            Again, thank you for allowing me to participate in the care of your patient.         Sincerely,        Bola Quiñonez, PETERM

## 2024-05-22 RX ORDER — LEVOTHYROXINE SODIUM 50 UG/1
50 TABLET ORAL DAILY
COMMUNITY
Start: 2024-03-01

## 2024-05-22 RX ORDER — ESCITALOPRAM OXALATE 5 MG/1
1 TABLET ORAL DAILY
COMMUNITY
Start: 2024-02-27

## 2024-06-09 ENCOUNTER — HEALTH MAINTENANCE LETTER (OUTPATIENT)
Age: 39
End: 2024-06-09

## 2024-10-27 ENCOUNTER — HEALTH MAINTENANCE LETTER (OUTPATIENT)
Age: 39
End: 2024-10-27

## 2025-02-01 ENCOUNTER — HEALTH MAINTENANCE LETTER (OUTPATIENT)
Age: 40
End: 2025-02-01

## 2025-02-18 ENCOUNTER — ANCILLARY PROCEDURE (OUTPATIENT)
Dept: GENERAL RADIOLOGY | Facility: CLINIC | Age: 40
End: 2025-02-18
Attending: NURSE PRACTITIONER
Payer: COMMERCIAL

## 2025-02-18 ENCOUNTER — OFFICE VISIT (OUTPATIENT)
Dept: URGENT CARE | Facility: URGENT CARE | Age: 40
End: 2025-02-18
Payer: COMMERCIAL

## 2025-02-18 VITALS
WEIGHT: 225 LBS | TEMPERATURE: 99 F | HEART RATE: 92 BPM | DIASTOLIC BLOOD PRESSURE: 77 MMHG | OXYGEN SATURATION: 97 % | RESPIRATION RATE: 19 BRPM | SYSTOLIC BLOOD PRESSURE: 115 MMHG

## 2025-02-18 DIAGNOSIS — R05.1 ACUTE COUGH: ICD-10-CM

## 2025-02-18 DIAGNOSIS — J20.9 ACUTE BRONCHITIS, UNSPECIFIED ORGANISM: Primary | ICD-10-CM

## 2025-02-18 LAB
BASOPHILS # BLD AUTO: 0 10E3/UL (ref 0–0.2)
BASOPHILS NFR BLD AUTO: 1 %
EOSINOPHIL # BLD AUTO: 0.4 10E3/UL (ref 0–0.7)
EOSINOPHIL NFR BLD AUTO: 6 %
ERYTHROCYTE [DISTWIDTH] IN BLOOD BY AUTOMATED COUNT: 12.4 % (ref 10–15)
FLUAV AG SPEC QL IA: NEGATIVE
FLUBV AG SPEC QL IA: NEGATIVE
HCT VFR BLD AUTO: 39.3 % (ref 35–47)
HGB BLD-MCNC: 13.3 G/DL (ref 11.7–15.7)
IMM GRANULOCYTES # BLD: 0 10E3/UL
IMM GRANULOCYTES NFR BLD: 0 %
LYMPHOCYTES # BLD AUTO: 2.5 10E3/UL (ref 0.8–5.3)
LYMPHOCYTES NFR BLD AUTO: 41 %
MCH RBC QN AUTO: 30.7 PG (ref 26.5–33)
MCHC RBC AUTO-ENTMCNC: 33.8 G/DL (ref 31.5–36.5)
MCV RBC AUTO: 91 FL (ref 78–100)
MONOCYTES # BLD AUTO: 0.5 10E3/UL (ref 0–1.3)
MONOCYTES NFR BLD AUTO: 8 %
NEUTROPHILS # BLD AUTO: 2.8 10E3/UL (ref 1.6–8.3)
NEUTROPHILS NFR BLD AUTO: 45 %
PLATELET # BLD AUTO: 303 10E3/UL (ref 150–450)
RBC # BLD AUTO: 4.33 10E6/UL (ref 3.8–5.2)
WBC # BLD AUTO: 6.2 10E3/UL (ref 4–11)

## 2025-02-18 PROCEDURE — 87804 INFLUENZA ASSAY W/OPTIC: CPT | Performed by: NURSE PRACTITIONER

## 2025-02-18 PROCEDURE — 36415 COLL VENOUS BLD VENIPUNCTURE: CPT | Performed by: NURSE PRACTITIONER

## 2025-02-18 PROCEDURE — 71046 X-RAY EXAM CHEST 2 VIEWS: CPT | Mod: TC | Performed by: RADIOLOGY

## 2025-02-18 PROCEDURE — 85025 COMPLETE CBC W/AUTO DIFF WBC: CPT | Performed by: NURSE PRACTITIONER

## 2025-02-18 PROCEDURE — 99204 OFFICE O/P NEW MOD 45 MIN: CPT | Performed by: NURSE PRACTITIONER

## 2025-02-18 RX ORDER — BENZONATATE 100 MG/1
100 CAPSULE ORAL 3 TIMES DAILY PRN
Qty: 30 CAPSULE | Refills: 0 | Status: SHIPPED | OUTPATIENT
Start: 2025-02-18 | End: 2025-02-28

## 2025-02-18 RX ORDER — OLMESARTAN MEDOXOMIL 20 MG/1
1 TABLET ORAL DAILY
COMMUNITY
Start: 2024-12-16

## 2025-02-18 RX ORDER — LEVOTHYROXINE SODIUM 100 UG/1
TABLET ORAL
COMMUNITY
Start: 2024-12-17

## 2025-02-18 RX ORDER — PREDNISONE 20 MG/1
40 TABLET ORAL DAILY
Qty: 10 TABLET | Refills: 0 | Status: SHIPPED | OUTPATIENT
Start: 2025-02-18 | End: 2025-02-23

## 2025-02-18 NOTE — PROGRESS NOTES
ICD-10-CM    1. Acute bronchitis, unspecified organism  J20.9       2. Acute cough  R05.1 CBC with platelets and differential     Influenza A & B Antigen - Clinic Collect     XR Chest 2 Views     CBC with platelets and differential      No underlying lung condition thus no need for antibiotics.  Will treat with steroids and benzonatate for cough.    Rest.  Fluids.  Delsym for cough suppression at night.   Tylenol or ibuprofen as needed for fever or pain.  Recheck in 10 days if symptoms have not improved, sooner if they worsen.      Red flag warning signs and when to go to the emergency room discussed.  Reviewed potential adverse reactions to medications.    Chest x-ray as read by this provider shows no acute infiltrates, effusions or pneumothorax.  Heart is within normal size limits.    Labs:  Results for orders placed or performed in visit on 02/18/25 (from the past 24 hours)   Influenza A & B Antigen - Clinic Collect    Specimen: Nose; Swab   Result Value Ref Range    Influenza A antigen Negative Negative    Influenza B antigen Negative Negative    Narrative    Test results must be correlated with clinical data. If necessary, results should be confirmed by a molecular assay or viral culture.   CBC with platelets and differential    Narrative    The following orders were created for panel order CBC with platelets and differential.  Procedure                               Abnormality         Status                     ---------                               -----------         ------                     CBC with platelets and d...[[id number]]                      Final result                 Please view results for these tests on the individual orders.   CBC with platelets and differential   Result Value Ref Range    WBC Count 6.2 4.0 - 11.0 10e3/uL    RBC Count 4.33 3.80 - 5.20 10e6/uL    Hemoglobin 13.3 11.7 - 15.7 g/dL    Hematocrit 39.3 35.0 - 47.0 %    MCV 91 78 - 100 fL    MCH 30.7 26.5 - 33.0 pg    MCHC 33.8  31.5 - 36.5 g/dL    RDW 12.4 10.0 - 15.0 %    Platelet Count 303 150 - 450 10e3/uL    % Neutrophils 45 %    % Lymphocytes 41 %    % Monocytes 8 %    % Eosinophils 6 %    % Basophils 1 %    % Immature Granulocytes 0 %    Absolute Neutrophils 2.8 1.6 - 8.3 10e3/uL    Absolute Lymphocytes 2.5 0.8 - 5.3 10e3/uL    Absolute Monocytes 0.5 0.0 - 1.3 10e3/uL    Absolute Eosinophils 0.4 0.0 - 0.7 10e3/uL    Absolute Basophils 0.0 0.0 - 0.2 10e3/uL    Absolute Immature Granulocytes 0.0 <=0.4 10e3/uL   XR Chest 2 Views    Narrative    EXAM: XR CHEST 2 VIEWS  LOCATION: [facility] [place]  DATE: 2/18/2025    INDICATION: Acute cough  COMPARISON: None.      Impression    IMPRESSION: Negative chest. Lungs clear.       SUBJECTIVE:   Jaycee Higgins is a 39 year old female presenting with a chief complaint of   Chief Complaint   Patient presents with    Urgent Care     Pt presents wheezing cough, fatigue, hard time breathing at night due to cough, onset over a month. Pt is concerned about pneumonia or bronchitis. Pt is a .        Review of systems is negative except for as noted in the HPI.    OBJECTIVE  /77   Pulse 92   Temp 99  F (37.2  C) (Tympanic)   Resp 19   Wt 102.1 kg (225 lb)   SpO2 97%     GENERAL: Alert, mild distress  SKIN: skin is clear, no rash or abnormal pigmentation  HEAD: The head is normocephalic.   EYES: The eyes are normal. The conjunctivae and cornea normal.   NECK: The neck is supple and thyroid is normal, no masses; LYMPH NODES: No adenopathy  HENT: Bilateral tympanic membranes and canals appear normal, nasal passages have clear rhinorrhea, pharynx is normal  LUNGS: Expiratory wheeze heard in lower lobes otherwise clear, dry hacking cough  CV: Rhythm is regular. S1 and S2 are normal. No murmurs.  EXTREMITIES: Symmetric extremities no deformities    Soraya Salvador APRN, CNP  [facility] Provider    The use of Dragon/[employer] dictation services may  have been used to construct the content in this note; any grammatical or spelling errors are non-intentional. Please contact the author of this note directly if you are in need of any clarification.

## 2025-02-18 NOTE — LETTER
February 18, 2025      Jaycee Higgins  [address]     [place] [address]        To Whom It May Concern:    Jaycee Higgins  was seen on 02/18/2025.  Please excuse her  until 02/21/2025 due to illness.        Sincerely,        ABDIEL REILLY CNP    Electronically signed

## 2025-02-18 NOTE — PATIENT INSTRUCTIONS
May take Delsym cough syrup at night for cough suppression or if you are just trying to rest during the day.  This may make you sleepy.

## 2025-05-04 ENCOUNTER — HEALTH MAINTENANCE LETTER (OUTPATIENT)
Age: 40
End: 2025-05-04

## 2025-07-06 ENCOUNTER — HEALTH MAINTENANCE LETTER (OUTPATIENT)
Age: 40
End: 2025-07-06

## 2025-08-17 ENCOUNTER — HEALTH MAINTENANCE LETTER (OUTPATIENT)
Age: 40
End: 2025-08-17